# Patient Record
Sex: MALE | Race: WHITE | NOT HISPANIC OR LATINO | Employment: PART TIME | URBAN - METROPOLITAN AREA
[De-identification: names, ages, dates, MRNs, and addresses within clinical notes are randomized per-mention and may not be internally consistent; named-entity substitution may affect disease eponyms.]

---

## 2024-10-20 ENCOUNTER — HOSPITAL ENCOUNTER (EMERGENCY)
Facility: HOSPITAL | Age: 69
Discharge: HOME | End: 2024-10-20
Attending: INTERNAL MEDICINE
Payer: MEDICARE

## 2024-10-20 VITALS
WEIGHT: 224.44 LBS | OXYGEN SATURATION: 99 % | RESPIRATION RATE: 18 BRPM | HEART RATE: 81 BPM | SYSTOLIC BLOOD PRESSURE: 186 MMHG | HEIGHT: 69 IN | DIASTOLIC BLOOD PRESSURE: 97 MMHG | TEMPERATURE: 98 F | BODY MASS INDEX: 33.24 KG/M2

## 2024-10-20 DIAGNOSIS — B86 SCABIES: Primary | ICD-10-CM

## 2024-10-20 PROCEDURE — 99283 EMERGENCY DEPT VISIT LOW MDM: CPT

## 2024-10-20 RX ORDER — LORATADINE 10 MG/1
10 TABLET ORAL DAILY
Qty: 15 TABLET | Refills: 0 | Status: SHIPPED | OUTPATIENT
Start: 2024-10-20 | End: 2024-11-04

## 2024-10-20 RX ORDER — PERMETHRIN 50 MG/G
CREAM TOPICAL ONCE
Qty: 60 G | Refills: 0 | Status: SHIPPED | OUTPATIENT
Start: 2024-10-20 | End: 2024-10-20

## 2024-10-21 NOTE — ED PROVIDER NOTES
Encounter Date: 10/20/2024  History from patient     History     Chief Complaint   Patient presents with    Rash     C/O itching rash to arms legs/torso x 2-3 days.     HPI    Luigi Chambers is 69 y.o. male who  has no past medical history on file. arrives in ER with c/o Rash (C/O itching rash to arms legs/torso x 2-3 days.)      Review of patient's allergies indicates:  No Known Allergies  History reviewed. No pertinent past medical history.  History reviewed. No pertinent surgical history.  No family history on file.     Review of Systems   Constitutional:  Negative for fever.   HENT:  Negative for trouble swallowing and voice change.    Eyes:  Negative for visual disturbance.   Respiratory:  Negative for cough and shortness of breath.    Cardiovascular:  Negative for chest pain.   Gastrointestinal:  Negative for abdominal pain, diarrhea and vomiting.   Genitourinary:  Negative for dysuria and hematuria.   Musculoskeletal:  Negative for back pain and gait problem.   Skin:  Positive for rash. Negative for color change.        Generalized rash which itches more at night   Neurological:  Negative for headaches.   Psychiatric/Behavioral:  Negative for behavioral problems and sleep disturbance.    All other systems reviewed and are negative.      Physical Exam     Initial Vitals [10/20/24 2310]   BP Pulse Resp Temp SpO2   (!) 186/97 81 18 97.8 °F (36.6 °C) 99 %      MAP       --         Physical Exam    Nursing note and vitals reviewed.  Constitutional: He appears well-developed and well-nourished. No distress.   HENT:   Head: Atraumatic.   Eyes: EOM are normal.   Cardiovascular:  Normal heart sounds.           Pulmonary/Chest: Breath sounds normal.   Abdominal: Abdomen is soft. Bowel sounds are normal.   Musculoskeletal:         General: Normal range of motion.      Cervical back: No bony tenderness.     Neurological: He is alert.   Speech Normal   Skin: Skin is dry.   Maculopapular rash, more in the inguinal regions  arm pits, on the hands,   Psychiatric: He has a normal mood and affect.   Pleasant         ED Course   Procedures  Labs Reviewed - No data to display       Imaging Results    None          Medications - No data to display  Medical Decision Making    Luigi Chambers is 69 y.o. male who  has no past medical history on file. arrives in ER with c/o Rash (C/O itching rash to arms legs/torso x 2-3 days.)    Patient is here with the [facility], and he said for the last couple of days it started having generalized rash and itching, so today before leaving he decided to come to the emergency room to get some treatment for this.    He has generalized rash, maculopapular, more in the finger webs, groin area,                                      Clinical Impression:  Final diagnoses:  [B86] Scabies (Primary)          ED Disposition Condition    Discharge Stable          ED Prescriptions       Medication Sig Dispense Start Date End Date Auth. Provider    permethrin (ELIMITE) 5 % cream () Apply topically once. for 1 dose 60 g 10/20/2024 10/20/2024 Misty Sprague MD    loratadine (CLARITIN) 10 mg tablet Take 1 tablet (10 mg total) by mouth once daily. for 15 days 15 tablet 10/20/2024 2024 Misty Sprague MD          Follow-up Information       Follow up With Specialties Details Why Contact Info    PMD  In 2 days               Misty Sprague MD  10/21/24 [id number]